# Patient Record
Sex: FEMALE | Race: BLACK OR AFRICAN AMERICAN | URBAN - METROPOLITAN AREA
[De-identification: names, ages, dates, MRNs, and addresses within clinical notes are randomized per-mention and may not be internally consistent; named-entity substitution may affect disease eponyms.]

---

## 2023-05-16 ENCOUNTER — EMERGENCY (EMERGENCY)
Facility: HOSPITAL | Age: 28
LOS: 0 days | Discharge: HOME | End: 2023-05-16
Attending: EMERGENCY MEDICINE
Payer: MEDICAID

## 2023-05-16 VITALS
TEMPERATURE: 98 F | HEIGHT: 64 IN | RESPIRATION RATE: 14 BRPM | SYSTOLIC BLOOD PRESSURE: 141 MMHG | WEIGHT: 205.91 LBS | HEART RATE: 92 BPM | DIASTOLIC BLOOD PRESSURE: 77 MMHG | OXYGEN SATURATION: 98 %
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
## 2023-05-16 DIAGNOSIS — H57.11 OCULAR PAIN, RIGHT EYE: ICD-10-CM
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
## 2023-05-16 DIAGNOSIS — H57.89 OTHER SPECIFIED DISORDERS OF EYE AND ADNEXA: ICD-10-CM

## 2023-05-16 DIAGNOSIS — S05.01XA INJURY OF CONJUNCTIVA AND CORNEAL ABRASION WITHOUT FOREIGN BODY, RIGHT EYE, INITIAL ENCOUNTER: ICD-10-CM

## 2023-05-16 PROCEDURE — 99283 EMERGENCY DEPT VISIT LOW MDM: CPT

## 2023-05-16 NOTE — ED ADULT TRIAGE NOTE - CHIEF COMPLAINT QUOTE
pt co right eye pain after going to eye doctor yesterday, went back today and eye doc sent her here.

## 2023-05-16 NOTE — ED PROVIDER NOTE - OBJECTIVE STATEMENT
Patient is a 27-year-old female with no significant past medical history p/w right eye discomfort and foreign body sensation s/p seeing her eye doctor in the office yesterday. Patient states that when she was at her eye doctor appointment yesterday she believes one of the machines they used to examine her eyes a contact with the surface of her right eye, subsequently scratching it. Patient reports associated foreign body sensation and discomfort afterward. Patient went back to her eye doctor today complaining of the symptoms and was informed that she had a corneal abrasion and prescribed antibiotic eyedrops. Patient is in the ED now seeking 2nd opinion. No significant blurry vision, pain inside or behind the eyeball, swelling or redness to the eye, fever/chills/malaise, or other symptoms. Patient states pain is not worse on extraocular movements.

## 2023-05-16 NOTE — ED ADULT NURSE NOTE - OBJECTIVE STATEMENT
Pt c/o eye injury that occurred at the eye doctor yesterday. Pt c/o of right eye burning, itching and irritation.

## 2023-05-16 NOTE — ED PROVIDER NOTE - PROVIDER TOKENS
FREE:[LAST:[Your eye doctor],PHONE:[(   )    -],FAX:[(   )    -],FOLLOWUP:[4-6 Days],ESTABLISHEDPATIENT:[T]]

## 2023-05-16 NOTE — ED PROVIDER NOTE - CARE PROVIDERS DIRECT ADDRESSES
PA for Lansoprazole was submitted through cover my meds today and approval was rec'd.
Spoke with patient she verbalizse understanding hold Eliquis x 3 days prior to surgery   Inquired if she needed something in writing patient states that as long as its in the computer they can see it .  
,DirectAddress_Unknown

## 2023-05-16 NOTE — ED PROVIDER NOTE - PATIENT PORTAL LINK FT
You can access the FollowMyHealth Patient Portal offered by [facility] by registering at the following website: [email or web address]. By joining [employer]’s FollowMyHealth portal, you will also be able to view your health information using other applications (apps) compatible with our system.

## 2023-05-16 NOTE — ED PROVIDER NOTE - CARE PROVIDER_API CALL
Your eye doctor,   Phone: (   )    -  Fax: (   )    -  Established Patient  Follow Up Time: 4-6 Days

## 2023-05-16 NOTE — ED ADULT NURSE NOTE - NSFALLUNIVINTERV_ED_ALL_ED
Bed/Stretcher in lowest position, wheels locked, appropriate side rails in place/Call bell, personal items and telephone in reach/Instruct patient to call for assistance before getting out of bed/chair/stretcher/Non-slip footwear applied when patient is off stretcher/[place] to call system/Physically safe environment - no spills, clutter or unnecessary equipment/Purposeful proactive rounding/Room/bathroom lighting operational, light cord in reach

## 2023-05-16 NOTE — ED PROCEDURE NOTE - GENERAL PROCEDURE DETAILS
Tetracaine applied to both eyes followed by fluorescein dye. Corneal abrasion visualized to right eye. No corneal abrasion left eye.

## 2023-05-16 NOTE — ED PROVIDER NOTE - CLINICAL SUMMARY MEDICAL DECISION MAKING FREE TEXT BOX
28yo healthy woman presents for reassessment after her eye doctor told her a corneal abrasion was the reason her eye was irritated. On exam, she is well appearing, no visual complaints. Fluorescein stain c/w corneal abrasion. Reassurance, advised continue with drops given by her ophthalmologist.

## 2023-05-16 NOTE — ED PROVIDER NOTE - PHYSICAL EXAMINATION
CONSTITUTIONAL:  NAD;   SKIN:  warm, dry;   HEAD:  NCAT;   EYES: + corneal abrasion seen in right eye on fluorescein exam, + mild tearing to right eye; no surrounding erythema, no purulent discharge, no TTP to orbit/periorbital tissues, EOMI, PERRLA, NL inspection of left eye  ENT: TMs clear bilaterally, no mastoid TTP or erythema, posterior oropharynx clear, uvula midline, MMM;   NECK: supple; normal ROM;   RESP: no increased work of breathing  MSK:  no extremity injury/deformity;   NEURO:  grossly unremarkable;   PSYCH:  cooperative, appropriate;

## 2023-12-23 ENCOUNTER — EMERGENCY (EMERGENCY)
Facility: HOSPITAL | Age: 28
LOS: 0 days | Discharge: HOME | End: 2023-12-23
Attending: STUDENT IN AN ORGANIZED HEALTH CARE EDUCATION/TRAINING PROGRAM
Payer: MEDICAID

## 2023-12-23 VITALS
RESPIRATION RATE: 16 BRPM | WEIGHT: 191.8 LBS | TEMPERATURE: 99 F | HEART RATE: 80 BPM | DIASTOLIC BLOOD PRESSURE: 63 MMHG | OXYGEN SATURATION: 99 % | SYSTOLIC BLOOD PRESSURE: 127 MMHG
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
## 2023-12-23 DIAGNOSIS — S05.01XA INJURY OF CONJUNCTIVA AND CORNEAL ABRASION WITHOUT FOREIGN BODY, RIGHT EYE, INITIAL ENCOUNTER: ICD-10-CM
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
## 2023-12-23 DIAGNOSIS — H57.89 OTHER SPECIFIED DISORDERS OF EYE AND ADNEXA: ICD-10-CM

## 2023-12-23 PROCEDURE — 99284 EMERGENCY DEPT VISIT MOD MDM: CPT

## 2023-12-23 PROCEDURE — 99283 EMERGENCY DEPT VISIT LOW MDM: CPT

## 2023-12-23 RX ORDER — POLYMYXIN B SULF/TRIMETHOPRIM 10000-1/ML
1 DROPS OPHTHALMIC (EYE)
Qty: 1 | Refills: 0
Start: 2023-12-23 | End: 2023-12-29

## 2023-12-23 RX ORDER — POLYMYXIN B SULF/TRIMETHOPRIM 10000-1/ML
1 DROPS OPHTHALMIC (EYE) ONCE
Refills: 0 | Status: COMPLETED | OUTPATIENT
Start: 2023-12-23 | End: 2023-12-23

## 2023-12-23 RX ORDER — FLUORESCEIN SODIUM 9 MG
1 STRIP OPHTHALMIC (EYE) ONCE
Refills: 0 | Status: COMPLETED | OUTPATIENT
Start: 2023-12-23 | End: 2023-12-23

## 2023-12-23 RX ADMIN — Medication 1 DROP(S): at 10:41

## 2023-12-23 RX ADMIN — Medication 1 DROP(S): at 11:28

## 2023-12-23 RX ADMIN — Medication 1 APPLICATION(S): at 10:41

## 2023-12-23 NOTE — ED PROVIDER NOTE - PHYSICAL EXAMINATION
CONSTITUTIONAL: well-appearing, in NAD  SKIN: Warm dry, normal skin turgor  HEAD: NCAT  EYES: EOMI, PERRLA, no scleral icterus, conjunctival injection present in right eye with copious tears. Light sensitivity in right eye. Corneal ulcer present on midline pupil region of right eye w/ fluorecin staining. Eye pressure 21 b/l. Visual acuity 6/21 b/l (baseline)    ENT: normal pharynx with no erythema or exudates  NECK: Supple; non tender. Full ROM.  CARD: RRR  RESP: clear to ausculation b/l. No crackles or wheezing.  EXT: Full ROM, no bony tenderness, no pedal edema, no calf tenderness  NEURO: normal motor. normal sensory. Normal gait.  PSYCH: Cooperative, appropriate.

## 2023-12-23 NOTE — ED PROVIDER NOTE - OBJECTIVE STATEMENT
Patient is a 28-year-old female with no past medical history presenting to the ED complaining of eye redness.  Patient states 2 days ago she started to have redness in her right eye.  There is no discharge but her eye is watering and sensitive to light.  Patient is not a contact wearer.  Patient had similar episode in May which was treated with antibiotics.  Patient denies any trauma to the eye.    Denies any nausea, vomiting, diarrhea, fevers, chills, headaches

## 2023-12-23 NOTE — ED PROVIDER NOTE - CLINICAL SUMMARY MEDICAL DECISION MAKING FREE TEXT BOX
.    28-year-old female, no skin past medical history, presents with right eye pain and redness x 2 days.  No discharge, visual changes, fever, trauma.    Patient is NAD, exam as noted, + fluorescein uptake at about the middle of the cornea, negative Bianca.    IMP: Corneal abrasion.  Patient stable for discharge with ophthalmic antibiotics, supportive care, and outpatient follow-up as needed.  Patient understands signs and symptoms for ED return.  DC home.    .

## 2023-12-23 NOTE — ED PROVIDER NOTE - PATIENT PORTAL LINK FT
You can access the FollowMyHealth Patient Portal offered by  by registering at the following website: [email or web address]. By joining [employer]’s FollowMyHealth portal, you will also be able to view your health information using other applications (apps) compatible with our system. You can access the FollowMyHealth Patient Portal offered by [facility] by registering at the following website: [email or web address]. By joining [employer]’s FollowMyHealth portal, you will also be able to view your health information using other applications (apps) compatible with our system.

## 2023-12-23 NOTE — ED ADULT NURSE NOTE - NSFALLUNIVINTERV_ED_ALL_ED
Bed/Stretcher in lowest position, wheels locked, appropriate side rails in place/Call bell, personal items and telephone in reach/Instruct patient to call for assistance before getting out of bed/chair/stretcher/Non-slip footwear applied when patient is off stretcher/[place] to call system/Physically safe environment - no spills, clutter or unnecessary equipment/Purposeful proactive rounding/Room/bathroom lighting operational, light cord in reach Bed/Stretcher in lowest position, wheels locked, appropriate side rails in place/Call bell, personal items and telephone in reach/Instruct patient to call for assistance before getting out of bed/chair/stretcher/Non-slip footwear applied when patient is off stretcher/[place] to call system/Physically safe environment - no spills, clutter or unnecessary equipment/Purposeful proactive rounding/Room/bathroom lighting operational, light cord in reach

## 2023-12-23 NOTE — ED PROVIDER NOTE - NSFOLLOWUPINSTRUCTIONS_ED_ALL_ED_FT
Our Emergency Department Referral Coordinators will be reaching out to you in the next 24-48 hours from 9:00am to 5:00pm with a follow up appointment. Please expect a phone call from the hospital in that time frame. If you do not receive a call or if you have any questions or concerns, you can reach them at   [phone]       Corneal Abrasion       A corneal abrasion is a scratch or injury to the clear covering over the front of the eye (cornea). Your cornea forms a clear dome that protects your eye and helps to focus your vision. Your cornea is made up of many layers, but the surface layer is one of the most sensitive tissues in your body. A corneal abrasion can be very painful.    If a corneal abrasion is not treated, it can become infected and cause an ulcer. This can lead to scarring. A scarred cornea can affect your vision. Sometimes abrasions come back in the same area, even after the original injury has healed.      What are the causes?    This condition may be caused by:  •A poke in the eye.      •A gritty or irritating substance (foreign body) in the eye.      •Excessive eye rubbing.      •Very dry eyes.      •Certain eye infections.      •Contact lenses that fit poorly or are worn for a long period of time. You can also injure your cornea when putting contact lenses in your eye or taking them out.      •Eye surgery.      •Certain cornea problems may increase the chance of a corneal abrasion.      Sometimes, the cause is not known.      What are the signs or symptoms?    Symptoms of this condition include:  •Eye pain. The pain may get worse when you open and close your eye or when you move your eye.      •A feeling of something stuck in your eye.      •Tearing, redness, and sensitivity to light.      •Having trouble keeping your eye open, or not being able to keep it open.      •Blurred vision.      •Headache.        How is this diagnosed?    You may work with a health care provider who specializes in diseases and conditions of the eye (ophthalmologist). This condition may be diagnosed based on your medical history, symptoms, and an eye exam.    Before the eye exam, numbing drops may be put into your eye. You may also have dye put in your eye with a dropper or a small paper strip. The dye makes the abrasion easy to see when your ophthalmologist examines your eye with a light. Your ophthalmologist may look at your eye through an eye scope (slit lamp).      How is this treated?    Treatment may vary depending on the cause of your condition, and it may include:  •Washing out your eye.      •Removing any foreign bodies that are in your eye.      •Using antibiotic drops or ointment to treat or prevent an infection.      •Using a dilating drop to decrease inflammation and pain.      •Using steroid drops or ointment to treat redness, irritation, or inflammation.      •Applying a cold, wet cloth (cold compress) or ice pack to ease the pain.      •Taking pain medicine by mouth (orally).      In some cases, an eye patch or bandage soft contact lens might also be used. An eye patch should not be used if the corneal abrasion was related to contact lens wear as it can increase the chance of infection in these eyes.      Follow these instructions at home:    Medicines     •Use eye drops or ointments as told by your health care provider.      •If you were prescribed antibiotic drops or ointment, use them as told by your health care provider. Do not stop using the antibiotic even if you start to feel better.      •Take over-the-counter and prescription medicines only as told by your health care provider.    •Ask your health care provider if the medicine prescribed to you:  •Requires you to avoid driving or using heavy machinery.    •Can cause constipation. You may need to take these actions to prevent or treat constipation:  •Drink enough fluid to keep your urine pale yellow.      •Take over-the-counter or prescription medicines.      •Eat foods that are high in fiber, such as beans, whole grains, and fresh fruits and vegetables.      •Limit foods that are high in fat and processed sugars, such as fried or sweet foods.          Eye patch use   •If you have an eye patch, wear it as told by your health care provider.  •Do not drive or use machinery while wearing an eye patch. Your ability to  distances will be impaired.      •Follow instructions from your health care provider about when to remove the patch.        General instructions     •Ask your health care provider whether you can use a cold compress on your eye to relieve pain.      • Do not rub or touch your eye. Do not wash out your eye.      • Do not wear contact lenses until your health care provider says that this is okay.      •Avoid bright light and eye strain.      •Keep all follow-up visits as told by your health care provider. This is important for preventing infection and vision loss.        Contact a health care provider if:    •You continue to have eye pain and other symptoms for more than 2 days.      •You have new symptoms, such as worse redness, tearing, or discharge.      •You have discharge that makes your eyelids stick together in the morning.      •Your eye patch becomes so loose that you can blink your eye.      •Symptoms return after the original abrasion has healed.        Get help right away if:    •You have severe eye pain that does not get better with medicine.      •You have vision loss.        Summary    •A corneal abrasion is a scratch or injury to the clear covering over the front of the eye (cornea).      •It is important to get treatment for a corneal abrasion. If this problem is not treated, it can affect your vision.      •Use eye drops or ointments as told by your health care provider.      •If you have an eye patch, do not drive or use machinery while wearing it. Your ability to  distances will be impaired.      •Let your health care provider know if your symptoms continue for more than 2 days.      This information is not intended to replace advice given to you by your health care provider. Make sure you discuss any questions you have with your health care provider. Our Emergency Department Referral Coordinators will be reaching out to you in the next 24-48 hours from 9:00am to 5:00pm with a follow up appointment. Please expect a phone call from the hospital in that time frame. If you do not receive a call or if you have any questions or concerns, you can reach them at   [phone]       Corneal Abrasion       A corneal abrasion is a scratch or injury to the clear covering over the front of the eye (cornea). Your cornea forms a clear dome that protects your eye and helps to focus your vision. Your cornea is made up of many layers, but the surface layer is one of the most sensitive tissues in your body. A corneal abrasion can be very painful.    If a corneal abrasion is not treated, it can become infected and cause an ulcer. This can lead to scarring. A scarred cornea can affect your vision. Sometimes abrasions come back in the same area, even after the original injury has healed.      What are the causes?    This condition may be caused by:  •A poke in the eye.      •A gritty or irritating substance (foreign body) in the eye.      •Excessive eye rubbing.      •Very dry eyes.      •Certain eye infections.      •Contact lenses that fit poorly or are worn for a long period of time. You can also injure your cornea when putting contact lenses in your eye or taking them out.      •Eye surgery.      •Certain cornea problems may increase the chance of a corneal abrasion.      Sometimes, the cause is not known.      What are the signs or symptoms?    Symptoms of this condition include:  •Eye pain. The pain may get worse when you open and close your eye or when you move your eye.      •A feeling of something stuck in your eye.      •Tearing, redness, and sensitivity to light.      •Having trouble keeping your eye open, or not being able to keep it open.      •Blurred vision.      •Headache.        How is this diagnosed?    You may work with a health care provider who specializes in diseases and conditions of the eye (ophthalmologist). This condition may be diagnosed based on your medical history, symptoms, and an eye exam.    Before the eye exam, numbing drops may be put into your eye. You may also have dye put in your eye with a dropper or a small paper strip. The dye makes the abrasion easy to see when your ophthalmologist examines your eye with a light. Your ophthalmologist may look at your eye through an eye scope (slit lamp).      How is this treated?    Treatment may vary depending on the cause of your condition, and it may include:  •Washing out your eye.      •Removing any foreign bodies that are in your eye.      •Using antibiotic drops or ointment to treat or prevent an infection.      •Using a dilating drop to decrease inflammation and pain.      •Using steroid drops or ointment to treat redness, irritation, or inflammation.      •Applying a cold, wet cloth (cold compress) or ice pack to ease the pain.      •Taking pain medicine by mouth (orally).      In some cases, an eye patch or bandage soft contact lens might also be used. An eye patch should not be used if the corneal abrasion was related to contact lens wear as it can increase the chance of infection in these eyes.      Follow these instructions at home:    Medicines     •Use eye drops or ointments as told by your health care provider.      •If you were prescribed antibiotic drops or ointment, use them as told by your health care provider. Do not stop using the antibiotic even if you start to feel better.      •Take over-the-counter and prescription medicines only as told by your health care provider.    •Ask your health care provider if the medicine prescribed to you:  •Requires you to avoid driving or using heavy machinery.    •Can cause constipation. You may need to take these actions to prevent or treat constipation:  •Drink enough fluid to keep your urine pale yellow.      •Take over-the-counter or prescription medicines.      •Eat foods that are high in fiber, such as beans, whole grains, and fresh fruits and vegetables.      •Limit foods that are high in fat and processed sugars, such as fried or sweet foods.          Eye patch use   •If you have an eye patch, wear it as told by your health care provider.  •Do not drive or use machinery while wearing an eye patch. Your ability to  distances will be impaired.      •Follow instructions from your health care provider about when to remove the patch.        General instructions     •Ask your health care provider whether you can use a cold compress on your eye to relieve pain.      • Do not rub or touch your eye. Do not wash out your eye.      • Do not wear contact lenses until your health care provider says that this is okay.      •Avoid bright light and eye strain.      •Keep all follow-up visits as told by your health care provider. This is important for preventing infection and vision loss.        Contact a health care provider if:    •You continue to have eye pain and other symptoms for more than 2 days.      •You have new symptoms, such as worse redness, tearing, or discharge.      •You have discharge that makes your eyelids stick together in the morning.      •Your eye patch becomes so loose that you can blink your eye.      •Symptoms return after the original abrasion has healed.        Get help right away if:    •You have severe eye pain that does not get better with medicine.      •You have vision loss.        Summary    •A corneal abrasion is a scratch or injury to the clear covering over the front of the eye (cornea).      •It is important to get treatment for a corneal abrasion. If this problem is not treated, it can affect your vision.      •Use eye drops or ointments as told by your health care provider.      •If you have an eye patch, do not drive or use machinery while wearing it. Your ability to  distances will be impaired.      •Let your health care provider know if your symptoms continue for more than 2 days.      This information is not intended to replace advice given to you by your health care provider. Make sure you discuss any questions you have with your health care provider.

## 2023-12-27 NOTE — CHART NOTE - NSCHARTNOTEFT_GEN_A_CORE
patient scheduled for 12/28 at 11 AM for eye clinic at [address] [place] [address] patient scheduled for 12/28 at 11 AM for eye clinic at [address] [place] [address]

## 2023-12-28 ENCOUNTER — APPOINTMENT (OUTPATIENT)
Dept: OPHTHALMOLOGY | Facility: CLINIC | Age: 28
End: 2023-12-28
Payer: MEDICAID

## 2023-12-28 ENCOUNTER — OUTPATIENT (OUTPATIENT)
Dept: OUTPATIENT SERVICES | Facility: HOSPITAL | Age: 28
LOS: 1 days | End: 2023-12-28
Payer: MEDICAID

## 2023-12-28 DIAGNOSIS — H53.8 OTHER VISUAL DISTURBANCES: ICD-10-CM

## 2023-12-28 DIAGNOSIS — H20.9 UNSPECIFIED IRIDOCYCLITIS: ICD-10-CM

## 2023-12-28 PROBLEM — Z00.00 ENCOUNTER FOR PREVENTIVE HEALTH EXAMINATION: Status: ACTIVE | Noted: 2023-12-28

## 2023-12-28 PROCEDURE — 92002 INTRM OPH EXAM NEW PATIENT: CPT

## 2024-01-05 ENCOUNTER — APPOINTMENT (OUTPATIENT)
Dept: OPHTHALMOLOGY | Facility: CLINIC | Age: 29
End: 2024-01-05

## 2024-01-26 ENCOUNTER — APPOINTMENT (OUTPATIENT)
Dept: INTERNAL MEDICINE | Facility: CLINIC | Age: 29
End: 2024-01-26

## 2024-07-29 ENCOUNTER — TRANSCRIPTION ENCOUNTER (OUTPATIENT)
Age: 29
End: 2024-07-29

## 2024-07-29 ENCOUNTER — EMERGENCY (EMERGENCY)
Facility: HOSPITAL | Age: 29
LOS: 0 days | Discharge: HOME | End: 2024-07-29
Attending: EMERGENCY MEDICINE
Payer: MEDICAID

## 2024-07-29 VITALS
OXYGEN SATURATION: 99 % | WEIGHT: 190.92 LBS | HEART RATE: 76 BPM | RESPIRATION RATE: 16 BRPM | TEMPERATURE: 99 F | DIASTOLIC BLOOD PRESSURE: 80 MMHG | SYSTOLIC BLOOD PRESSURE: 119 MMHG
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
## 2024-07-29 DIAGNOSIS — M25.561 PAIN IN RIGHT KNEE: ICD-10-CM
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
## 2024-07-29 PROCEDURE — 99283 EMERGENCY DEPT VISIT LOW MDM: CPT | Mod: 26

## 2024-07-29 PROCEDURE — 73564 X-RAY EXAM KNEE 4 OR MORE: CPT | Mod: 26,RT

## 2024-07-29 PROCEDURE — 99284 EMERGENCY DEPT VISIT MOD MDM: CPT

## 2024-07-29 PROCEDURE — 96372 THER/PROPH/DIAG INJ SC/IM: CPT

## 2024-07-29 PROCEDURE — 73564 X-RAY EXAM KNEE 4 OR MORE: CPT | Mod: RT

## 2024-07-29 RX ORDER — KETOROLAC TROMETHAMINE 30 MG/ML
30 INJECTION, SOLUTION INTRAMUSCULAR ONCE
Refills: 0 | Status: DISCONTINUED | OUTPATIENT
Start: 2024-07-29 | End: 2024-07-29

## 2024-07-29 RX ADMIN — KETOROLAC TROMETHAMINE 30 MILLIGRAM(S): 30 INJECTION, SOLUTION INTRAMUSCULAR at 09:30

## 2024-07-29 NOTE — ED PROVIDER NOTE - PHYSICAL EXAMINATION
CONSTITUTIONAL: in no apparent distress.   ENT: Hearing is intact with good acuity to spoken voice.  Patient is speaking clearly, not muffled and airway is intact.   RESPIRATORY: No signs of respiratory distress.   CARDIOVASCULAR: Regular rate and rhythm.   MS: R knee pain with no obvious deformity or swelling; tender to palpation; decreased ROM; sensory function intact; distal pulse present. Rest of the upper and lower extremities unremarkable. Able to walk with assistance  NEURO: A & O x 3. Normal speech. No focal deficit.  PSYCHOLOGICAL: Appropriate mood and affect. Good judgement and insight.
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
## 2024-07-29 NOTE — ED PROVIDER NOTE - CARE PROVIDER_API CALL
Chris Durham  Orthopaedic Surgery  [address] [place] [address]  Phone: [phone]  Fax: [phone]  Follow Up Time: 1-3 Days

## 2024-07-29 NOTE — ED PROVIDER NOTE - PROGRESS NOTE DETAILS
X-ray unremarkable.  Patient is able to ambulate with assistance.  Patient stable for discharge.  Will have patient follow-up with Ortho outpatient.  Crutches and knee immobilizer applied.

## 2024-07-29 NOTE — ED PROVIDER NOTE - NSFOLLOWUPINSTRUCTIONS_ED_ALL_ED_FT
Please make sure to follow up with your primary care doctor in 3 days.    Knee Pain    Knee pain is a very common symptom and can have many causes. Knee pain often goes away when you follow your health care provider's instructions for relieving pain and discomfort at home. However, knee pain can develop into a condition that needs treatment. Some conditions may include:     Arthritis caused by wear and tear (osteoarthritis).  Arthritis caused by swelling and irritation (rheumatoid arthritis or gout).  A cyst or growth in your knee.  An infection in your knee joint.  An injury that will not heal.  Damage, swelling, or irritation of the tissues that support your knee (torn ligaments or tendinitis).    If your knee pain continues, additional tests may be ordered to diagnose your condition. Tests may include X-rays or other imaging studies of your knee. You may also need to have fluid removed from your knee. Treatment for ongoing knee pain depends on the cause, but treatment may include:    Medicines to relieve pain or swelling.  Steroid injections in your knee.  Physical therapy.  Surgery.    HOME CARE INSTRUCTIONS  Take medicines only as directed by your health care provider.   Rest your knee and keep it raised (elevated) while you are resting.  Do not do things that cause or worsen pain.  Avoid high-impact activities or exercises, such as running, jumping rope, or doing jumping jacks.  Apply ice to the knee area:  Put ice in a plastic bag.  Place a towel between your skin and the bag.  Leave the ice on for 20 minutes, 2–3 times a day.  Ask your health care provider if you should wear an elastic knee support.  Keep a pillow under your knee when you sleep.  Lose weight if you are overweight. Extra weight can put pressure on your knee.  Do not use any tobacco products, including cigarettes, chewing tobacco, or electronic cigarettes. If you need help quitting, ask your health care provider. Smoking may slow the healing of any bone and joint problems that you may have.    SEEK MEDICAL CARE IF:  Your knee pain continues, changes, or gets worse.  You have a fever along with knee pain.  Your knee tootie or locks up.  Your knee becomes more swollen.    SEEK IMMEDIATE MEDICAL CARE IF:  Your knee joint feels hot to the touch.  You have chest pain or trouble breathing.

## 2024-07-29 NOTE — ED PROVIDER NOTE - PATIENT PORTAL LINK FT
You can access the FollowMyHealth Patient Portal offered by [facility] by registering at the following website: [email or web address]. By joining [employer]’s FollowMyHealth portal, you will also be able to view your health information using other applications (apps) compatible with our system.

## 2024-07-30 ENCOUNTER — APPOINTMENT (OUTPATIENT)
Dept: ORTHOPEDIC SURGERY | Facility: CLINIC | Age: 29
End: 2024-07-30
Payer: MEDICAID

## 2024-07-30 DIAGNOSIS — S89.91XA UNSPECIFIED INJURY OF RIGHT LOWER LEG, INITIAL ENCOUNTER: ICD-10-CM

## 2024-07-30 PROCEDURE — 99203 OFFICE O/P NEW LOW 30 MIN: CPT

## 2024-07-30 RX ORDER — IBUPROFEN 800 MG/1
800 TABLET, FILM COATED ORAL 3 TIMES DAILY
Qty: 90 | Refills: 0 | Status: ACTIVE | COMMUNITY
Start: 2024-07-30 | End: 1900-01-01